# Patient Record
(demographics unavailable — no encounter records)

---

## 2025-01-14 NOTE — ASSESSMENT
[FreeTextEntry1] : 54 y/o male with progressive right knee medial compartment arthritis with the meniscus tear diagnosed over a year ago confirmed by MRI. I discussed various nonoperative and operative treatment options.  His arthritis is not bad enough to consider surgery.  Meniscus surgery could be considered but it would not change the arthritis.  It may or may not help his pain. He never did any injections and steroid injection as an option and possibly hyaluronic acid injections in the future. At this point he is going to try more physical therapy and we talked about weight loss to get pressure off the knee which may help with current symptoms but also potentially slow down the progression of arthritis. He can take some Tylenol or NSAIDs for pain.  Heat and ice as needed.  Follow-up in about 6 to 8 weeks to check his progress and decide on further treatment.

## 2025-01-14 NOTE — PHYSICAL EXAM
[LE] : Sensory: Intact in bilateral lower extremities [Normal RLE] : Right Lower Extremity: No scars, rashes, lesions, ulcers, skin intact [Normal LLE] : Left Lower Extremity: No scars, rashes, lesions, ulcers, skin intact [Normal Touch] : sensation intact for touch [Normal] : Oriented to person, place, and time, insight and judgement were intact and the affect was normal [Obese] : obese [de-identified] : RIGHT knee Mildly antalgic gait. Trace effusion.  No erythema, ecchymoses, edema. Tender medial joint line posteriorly and medial patellar facet. Knee range of motion is 0 to 125 degrees flexion with pain on full flexion and mild crepitus. Ia Lachman.  Negative anterior and posterior drawer.  Normal varus and valgus laxity. Discomfort with Iram test. Intact extensor mechanism. Normal neurovascular exam distally [de-identified] :  X-rays ordered, performed and reviewed today of RIGHT knee for knee pain weightbearing 4 views showed mild to moderate medial joint space narrowing approaching bone-on-bone on the flexed view and mild degenerative changes patellofemoral joint.  Small osteophytes medial compartment.  Lateral compartment well-maintained KL 2-3 arthritis  I independently reviewed the MRI below images and agree with complex tear of the medial meniscus and small amount of extrusion but no significant displaced flaps.  Also chondral wear patellofemoral joint and likely medial compartment early EXAM TYPE: MRI KNEE WITHOUT IV CONTRAST RIGHT     EXAM DATE AND TIME: 12/15/2023 7:09 AM INDICATION: Joint line pain TECHNIQUE: Non-contrast  MRI of the right knee utilizing a standard protocol. CONTRAST: None COMPARISON: Radiographs 12/5/2023 IMPRESSION:  Complex tear involving the majority of the medial meniscus, centered at the body with a small amount of meniscus material extruded into the meniscal tibial recess. Thinning of the medial articular cartilage. Fissuring of the medial patellar cartilage. FINDINGS: FLUID:  No knee joint effusion. No Baker's cyst. ANTERIOR & POSTERIOR CRUCIATE LIGAMENTS: normal, no tear or mucoid degeneration EXTENSOR MECHANISM, PATELLOFEMORAL:  Normal quadriceps, patellar tendons. Normal patellofemoral alignment and morphology. Full-thickness fissuring of the medial patellar cartilage with minimal subarticular marrow edema. MEDIAL COLLATERAL LIGAMENT:  Normal; no sprain. LATERAL COLLATERAL LIGAMENT COMPLEX: Normal iliotibial band, biceps femoris tendon, fibular collateral ligament, popliteus tendon; no sprain. MEDIAL MENISCUS, SUBCHONDRAL BONE, ARTICULAR CARTILAGE:  Complex tear with both radial and longitudinal components posterior horn, body and junction of the anterior horn of the medial meniscus. Small amount of meniscus material is extruded into the meniscal tibial recess. Mildly thinned articular cartilage. Normal subarticular bone. LATERAL MENISCUS, SUBCHONDRAL BONE, ARTICULAR CARTILAGE:  No lateral meniscus tear or extrusion. No articular cartilage loss. Normal subchondral bone. MISCELLANEOUS:  Normal popliteal fossa. No muscle atrophy or edema. Normal marrow.   CLINICAL INDICATION:  1.Pain TECHNIQUE:  XR KNEE 1 OR 2 VIEWS LEFT AND 3 VIEWS RIGHT COMPARISON STUDIES: None available FINDINGS/IMPRESSION: Right knee: Small patellar marginal osteophyte formation with preserved knee joint spaces. No knee joint effusion. No evidence of acute fracture or dislocation. Included evaluation of left knee demonstrates preserved femorotibial joint spaces.

## 2025-01-14 NOTE — HISTORY OF PRESENT ILLNESS
[de-identified] :  Mr. Tesfaye is a 53 year old male who comes in for evaluation for RIGHT knee pain that started about a year and a half ago.  He has medial knee pain. He is limited in the amount he can walk.  He has pain walking more than 2-3 blocks.  He is better with rest and worse with walking more than a quarter mile. He had a been seen by Dr. Jeffery Louie in 2023 who did MRI showing a meniscus tear. He was referred for PT which he did for a few months in 2024. It had been helpful at the time. He never really followed up with Dr. Louie after as his mother got sick. Friends recommended him to see me for another opinion.  Overall his pain is less than when the issue first started. He is able to do stairs much easier now. Walking is still an issue. He does not take any pain medications and just avoids aggravating activities.  He never had any injections in the knee. Pain is about a 5-6 out of 10.  He states that he has gained a lot of weight in the last few years.

## 2025-01-14 NOTE — HISTORY OF PRESENT ILLNESS
[de-identified] :  Mr. Tesfaye is a 53 year old male who comes in for evaluation for RIGHT knee pain that started about a year and a half ago.  He has medial knee pain. He is limited in the amount he can walk.  He has pain walking more than 2-3 blocks.  He is better with rest and worse with walking more than a quarter mile. He had a been seen by Dr. Jeffery Louie in 2023 who did MRI showing a meniscus tear. He was referred for PT which he did for a few months in 2024. It had been helpful at the time. He never really followed up with Dr. Louie after as his mother got sick. Friends recommended him to see me for another opinion.  Overall his pain is less than when the issue first started. He is able to do stairs much easier now. Walking is still an issue. He does not take any pain medications and just avoids aggravating activities.  He never had any injections in the knee. Pain is about a 5-6 out of 10.  He states that he has gained a lot of weight in the last few years.

## 2025-01-14 NOTE — ASSESSMENT
[FreeTextEntry1] : 52 y/o male with progressive right knee medial compartment arthritis with the meniscus tear diagnosed over a year ago confirmed by MRI. I discussed various nonoperative and operative treatment options.  His arthritis is not bad enough to consider surgery.  Meniscus surgery could be considered but it would not change the arthritis.  It may or may not help his pain. He never did any injections and steroid injection as an option and possibly hyaluronic acid injections in the future. At this point he is going to try more physical therapy and we talked about weight loss to get pressure off the knee which may help with current symptoms but also potentially slow down the progression of arthritis. He can take some Tylenol or NSAIDs for pain.  Heat and ice as needed.  Follow-up in about 6 to 8 weeks to check his progress and decide on further treatment.

## 2025-01-14 NOTE — PHYSICAL EXAM
[LE] : Sensory: Intact in bilateral lower extremities [Normal RLE] : Right Lower Extremity: No scars, rashes, lesions, ulcers, skin intact [Normal LLE] : Left Lower Extremity: No scars, rashes, lesions, ulcers, skin intact [Normal Touch] : sensation intact for touch [Normal] : Oriented to person, place, and time, insight and judgement were intact and the affect was normal [Obese] : obese [de-identified] : RIGHT knee Mildly antalgic gait. Trace effusion.  No erythema, ecchymoses, edema. Tender medial joint line posteriorly and medial patellar facet. Knee range of motion is 0 to 125 degrees flexion with pain on full flexion and mild crepitus. Ia Lachman.  Negative anterior and posterior drawer.  Normal varus and valgus laxity. Discomfort with Iram test. Intact extensor mechanism. Normal neurovascular exam distally [de-identified] :  X-rays ordered, performed and reviewed today of RIGHT knee for knee pain weightbearing 4 views showed mild to moderate medial joint space narrowing approaching bone-on-bone on the flexed view and mild degenerative changes patellofemoral joint.  Small osteophytes medial compartment.  Lateral compartment well-maintained KL 2-3 arthritis  I independently reviewed the MRI below images and agree with complex tear of the medial meniscus and small amount of extrusion but no significant displaced flaps.  Also chondral wear patellofemoral joint and likely medial compartment early EXAM TYPE: MRI KNEE WITHOUT IV CONTRAST RIGHT     EXAM DATE AND TIME: 12/15/2023 7:09 AM INDICATION: Joint line pain TECHNIQUE: Non-contrast  MRI of the right knee utilizing a standard protocol. CONTRAST: None COMPARISON: Radiographs 12/5/2023 IMPRESSION:  Complex tear involving the majority of the medial meniscus, centered at the body with a small amount of meniscus material extruded into the meniscal tibial recess. Thinning of the medial articular cartilage. Fissuring of the medial patellar cartilage. FINDINGS: FLUID:  No knee joint effusion. No Baker's cyst. ANTERIOR & POSTERIOR CRUCIATE LIGAMENTS: normal, no tear or mucoid degeneration EXTENSOR MECHANISM, PATELLOFEMORAL:  Normal quadriceps, patellar tendons. Normal patellofemoral alignment and morphology. Full-thickness fissuring of the medial patellar cartilage with minimal subarticular marrow edema. MEDIAL COLLATERAL LIGAMENT:  Normal; no sprain. LATERAL COLLATERAL LIGAMENT COMPLEX: Normal iliotibial band, biceps femoris tendon, fibular collateral ligament, popliteus tendon; no sprain. MEDIAL MENISCUS, SUBCHONDRAL BONE, ARTICULAR CARTILAGE:  Complex tear with both radial and longitudinal components posterior horn, body and junction of the anterior horn of the medial meniscus. Small amount of meniscus material is extruded into the meniscal tibial recess. Mildly thinned articular cartilage. Normal subarticular bone. LATERAL MENISCUS, SUBCHONDRAL BONE, ARTICULAR CARTILAGE:  No lateral meniscus tear or extrusion. No articular cartilage loss. Normal subchondral bone. MISCELLANEOUS:  Normal popliteal fossa. No muscle atrophy or edema. Normal marrow.   CLINICAL INDICATION:  1.Pain TECHNIQUE:  XR KNEE 1 OR 2 VIEWS LEFT AND 3 VIEWS RIGHT COMPARISON STUDIES: None available FINDINGS/IMPRESSION: Right knee: Small patellar marginal osteophyte formation with preserved knee joint spaces. No knee joint effusion. No evidence of acute fracture or dislocation. Included evaluation of left knee demonstrates preserved femorotibial joint spaces.

## 2025-03-21 NOTE — HISTORY OF PRESENT ILLNESS
[de-identified] : 52 y/o M with a few weeks of L > R ear discomfort and sensation of something in the ear.  No pain.  No change in hearing.  NO d/c. no Vertigo. No nasal  chr phlegm in am and g/o GERD

## 2025-03-21 NOTE — END OF VISIT
[FreeTextEntry3] : I personally saw and examined SCOTT FIELDS in detail.I have made changes in changes in the body of the note where appropriate. I personally reviewed the HPI, PMH, FH, SH, ROS and medications/allergies. I have spoken to PAPO Lopes regarding the history and have personally determined the assessment and plan of care and documented this myself.. I performed all procedures and performed the physical exam. I have made changes in the body of the note where appropriate.   Attesting Faculty: See Attending Signature Below

## 2025-03-21 NOTE — PHYSICAL EXAM
[Midline] : trachea located in midline position [de-identified] : b/l delfinan  [Normal] : no rashes

## 2025-05-12 NOTE — PHYSICAL EXAM
[LE] : Sensory: Intact in bilateral lower extremities [Normal RLE] : Right Lower Extremity: No scars, rashes, lesions, ulcers, skin intact [Normal LLE] : Left Lower Extremity: No scars, rashes, lesions, ulcers, skin intact [Normal Touch] : sensation intact for touch [Obese] : obese [Normal] : Oriented to person, place, and time, insight and judgement were intact and the affect was normal [de-identified] : RIGHT knee Mildly antalgic gait. Trace effusion.  No erythema, ecchymoses, edema. Tender medial joint line posteriorly and medial patellar facet. Knee range of motion is 0 to 125 degrees flexion with pain on full flexion and mild crepitus. Ia Lachman.  Negative anterior and posterior drawer.  Normal varus and valgus laxity. Discomfort with Iram test. Intact extensor mechanism. Normal neurovascular exam distally [de-identified] :  X-rays 1/14/25 RIGHT knee for knee pain weightbearing 4 views showed mild to moderate medial joint space narrowing approaching bone-on-bone on the flexed view and mild degenerative changes patellofemoral joint.  Small osteophytes medial compartment.  Lateral compartment well-maintained KL 2-3 arthritis   MRI below images and agree with complex tear of the medial meniscus and small amount of extrusion but no significant displaced flaps.  Also chondral wear patellofemoral joint and likely medial compartment early EXAM TYPE: MRI KNEE WITHOUT IV CONTRAST RIGHT     EXAM DATE AND TIME: 12/15/2023 7:09 AM INDICATION: Joint line pain TECHNIQUE: Non-contrast  MRI of the right knee utilizing a standard protocol. CONTRAST: None COMPARISON: Radiographs 12/5/2023 IMPRESSION:  Complex tear involving the majority of the medial meniscus, centered at the body with a small amount of meniscus material extruded into the meniscal tibial recess. Thinning of the medial articular cartilage. Fissuring of the medial patellar cartilage. FINDINGS: FLUID:  No knee joint effusion. No Baker's cyst. ANTERIOR & POSTERIOR CRUCIATE LIGAMENTS: normal, no tear or mucoid degeneration EXTENSOR MECHANISM, PATELLOFEMORAL:  Normal quadriceps, patellar tendons. Normal patellofemoral alignment and morphology. Full-thickness fissuring of the medial patellar cartilage with minimal subarticular marrow edema. MEDIAL COLLATERAL LIGAMENT:  Normal; no sprain. LATERAL COLLATERAL LIGAMENT COMPLEX: Normal iliotibial band, biceps femoris tendon, fibular collateral ligament, popliteus tendon; no sprain. MEDIAL MENISCUS, SUBCHONDRAL BONE, ARTICULAR CARTILAGE:  Complex tear with both radial and longitudinal components posterior horn, body and junction of the anterior horn of the medial meniscus. Small amount of meniscus material is extruded into the meniscal tibial recess. Mildly thinned articular cartilage. Normal subarticular bone. LATERAL MENISCUS, SUBCHONDRAL BONE, ARTICULAR CARTILAGE:  No lateral meniscus tear or extrusion. No articular cartilage loss. Normal subchondral bone. MISCELLANEOUS:  Normal popliteal fossa. No muscle atrophy or edema. Normal marrow.   CLINICAL INDICATION:  1.Pain TECHNIQUE:  XR KNEE 1 OR 2 VIEWS LEFT AND 3 VIEWS RIGHT COMPARISON STUDIES: None available FINDINGS/IMPRESSION: Right knee: Small patellar marginal osteophyte formation with preserved knee joint spaces. No knee joint effusion. No evidence of acute fracture or dislocation. Included evaluation of left knee demonstrates preserved femorotibial joint spaces.

## 2025-05-12 NOTE — HISTORY OF PRESENT ILLNESS
[de-identified] :  Mr. Tesfaye is a 53 year old male who comes in for evaluation for RIGHT knee pain that started about a year and a half ago.